# Patient Record
Sex: MALE | Race: OTHER | URBAN - METROPOLITAN AREA
[De-identification: names, ages, dates, MRNs, and addresses within clinical notes are randomized per-mention and may not be internally consistent; named-entity substitution may affect disease eponyms.]

---

## 2022-02-03 ENCOUNTER — EMERGENCY (EMERGENCY)
Facility: HOSPITAL | Age: 47
LOS: 1 days | Discharge: HOME | End: 2022-02-03
Admitting: EMERGENCY MEDICINE
Payer: COMMERCIAL

## 2022-02-03 VITALS
DIASTOLIC BLOOD PRESSURE: 86 MMHG | HEART RATE: 82 BPM | SYSTOLIC BLOOD PRESSURE: 171 MMHG | OXYGEN SATURATION: 95 % | HEIGHT: 64 IN | RESPIRATION RATE: 17 BRPM | TEMPERATURE: 98 F | WEIGHT: 160.06 LBS

## 2022-02-03 DIAGNOSIS — F10.10 ALCOHOL ABUSE, UNCOMPLICATED: ICD-10-CM
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
## 2022-02-03 DIAGNOSIS — Z23 ENCOUNTER FOR IMMUNIZATION: ICD-10-CM

## 2022-02-03 DIAGNOSIS — S01.81XA LACERATION WITHOUT FOREIGN BODY OF OTHER PART OF HEAD, INITIAL ENCOUNTER: ICD-10-CM

## 2022-02-03 PROCEDURE — 90471 IMMUNIZATION ADMIN: CPT

## 2022-02-03 PROCEDURE — 99283 EMERGENCY DEPT VISIT LOW MDM: CPT | Mod: 25

## 2022-02-03 PROCEDURE — 90715 TDAP VACCINE 7 YRS/> IM: CPT

## 2022-02-03 PROCEDURE — 12013 RPR F/E/E/N/L/M 2.6-5.0 CM: CPT

## 2022-02-03 RX ORDER — TETANUS TOXOID, REDUCED DIPHTHERIA TOXOID AND ACELLULAR PERTUSSIS VACCINE, ADSORBED 5; 2.5; 8; 8; 2.5 [IU]/.5ML; [IU]/.5ML; UG/.5ML; UG/.5ML; UG/.5ML
0.5 SUSPENSION INTRAMUSCULAR ONCE
Refills: 0 | Status: COMPLETED | OUTPATIENT
Start: 2022-02-03 | End: 2022-02-03

## 2022-02-03 RX ORDER — TETANUS TOXOID, REDUCED DIPHTHERIA TOXOID AND ACELLULAR PERTUSSIS VACCINE, ADSORBED 5; 2.5; 8; 8; 2.5 [IU]/.5ML; [IU]/.5ML; UG/.5ML; UG/.5ML; UG/.5ML
0.5 SUSPENSION INTRAMUSCULAR ONCE
Refills: 0 | Status: DISCONTINUED | OUTPATIENT
Start: 2022-02-03 | End: 2022-02-03

## 2022-02-03 RX ADMIN — TETANUS TOXOID, REDUCED DIPHTHERIA TOXOID AND ACELLULAR PERTUSSIS VACCINE, ADSORBED 0.5 MILLILITER(S): 5; 2.5; 8; 8; 2.5 SUSPENSION INTRAMUSCULAR at 10:57

## 2022-02-03 NOTE — ED ADULT TRIAGE NOTE - CHIEF COMPLAINT QUOTE
Pt presents to ED c/o laceration to right side of forehead. Sustained injury last night at 1130 pm, fell on apartment door step. No LOC, no use of blood thinners. No bleeding at this time. Denies headache, dizziness, blurred vision.

## 2022-02-03 NOTE — ED ADULT NURSE NOTE - OBJECTIVE STATEMENT
46y M, A&ox3, presents to ed for laceration to right forehead. Reports had etoh last night and sustained fall. No LOC. Approximaatly 4cm laceration to right fore head. Strip placed by PA. Tetanus updated in ED today.

## 2022-02-03 NOTE — ED PROVIDER NOTE - MUSCULOSKELETAL, MLM
Spine appears normal, range of motion is not limited, no muscle or joint tenderness Double Island Pedicle Flap Text: The defect edges were debeveled with a #15 scalpel blade.  Given the location of the defect, shape of the defect and the proximity to free margins a double island pedicle advancement flap was deemed most appropriate.  Using a sterile surgical marker, an appropriate advancement flap was drawn incorporating the defect, outlining the appropriate donor tissue and placing the expected incisions within the relaxed skin tension lines where possible.    The area thus outlined was incised deep to adipose tissue with a #15 scalpel blade.  The skin margins were undermined to an appropriate distance in all directions around the primary defect and laterally outward around the island pedicle utilizing iris scissors.  There was minimal undermining beneath the pedicle flap.

## 2022-02-03 NOTE — ED PROVIDER NOTE - CLINICAL SUMMARY MEDICAL DECISION MAKING FREE TEXT BOX
Pt seen for laceration of forehead at 11:30pm last night, declined sutoring     lac was anchord with steri strips and dermabond was applied    return precautions disussed

## 2022-02-03 NOTE — ED PROVIDER NOTE - SKIN, MLM
4cm laceration to R forehead. Patient requesting laceration to be glued. Laceration is .5cm deep, advised suture would be more effective closure as risk of dehiscence w/ dermabond. Patient verbalized would like to attempt dermabond closure, declines plastics at this time.  Intact otherwise, no discoloration, no signs of infection, no rashes.

## 2022-02-03 NOTE — ED PROVIDER NOTE - PATIENT PORTAL LINK FT
You can access the FollowMyHealth Patient Portal offered by [facility] by registering at the following website: [email or web address]. By joining [facility]’s FollowMyHealth portal, you will also be able to view your health information using other applications (apps) compatible with our system.

## 2022-02-03 NOTE — ED PROVIDER NOTE - CPE EDP EYES NORM
Detail Level: Simple Consent: Written consent obtained and the risks of skin tag removal was reviewed with the patient including but not limited to bleeding, pigmentary change, infection, pain, and remote possibility of scarring. Price (Use Numbers Only, No Special Characters Or $): 50 Removed With: scissors normal...

## 2022-02-03 NOTE — ED PROVIDER NOTE - NSFOLLOWUPINSTRUCTIONS_ED_ALL_ED_FT
Your tetanus vaccine will last ten years      Please keep laceration out of the sun for 3 to 6 months    apply maderma in 1.5 weeks    keep the area clean and dry for 3-4 days    If you notice pus discharge, increased warmth, if the wound opens up please return to the ed to be checked for infection      Care For Your Absorbable Stitches    WHAT YOU NEED TO KNOW:    Absorbable stitches, or sutures, are used to close cuts or wounds. These stitches are absorbed by your body, or fall off on their own within days or weeks. They do not need to be removed.             DISCHARGE INSTRUCTIONS:    Seek care immediately if:   •Your wound comes apart.       •You have red streaks around your wound.      •You have swollen or painful lymph nodes.      •Blood soaks through your bandage.      Contact your healthcare provider if:   •You have signs of an infection, such as increased redness, pain, swelling, or pus.      •You have a fever.      •Your stitches do not absorb when your healthcare provider says they should.      •You have questions or concerns about your condition or care.      Care for your wound and absorbable stitches as directed:   •Protect the stitches. Wear protective clothing over the stitches, and protect the area from sunlight. Do not place pressure on your wound. This could open your wound and increase your risk for an infection.      •Clean your wound as directed. Carefully wash the wound with soap and water. Gently dry the area and put on new, clean bandages as directed. Change your bandages when they get wet or dirty. Check your wound for signs of infection when you clean it. Signs include redness, swelling, and pus.      •Keep the area dry as directed. Wait 12 to 24 hours after you receive your stitches before you take a shower. Take showers instead of baths. Do not take a bath or swim. Your healthcare provider will give you instructions for bathing with your stitches.      Help your wound heal:   •Elevate your wound. Keep your wound above the level of your heart as often as you can. This will help decrease swelling and pain. Prop the area on pillows or blankets, if possible, to keep it elevated comfortably.      •Limit activity. Do not stretch the skin around your wound. This will help prevent bleeding and swelling.      Follow up with your doctor as directed: Write down your questions so you remember to ask them during your visits. Your tetanus vaccine will last ten years    Follow up elevated blood pressure of 170/80 with your [facility] doctor, please inform him of your blood pressure     Please keep laceration out of the sun for 3 to 6 months    apply maderma in 1.5 weeks    keep the area clean and dry for 3-4 days    If you notice pus discharge, increased warmth, if the wound opens up please return to the ed to be checked for infection      Care For Your Absorbable Stitches    WHAT YOU NEED TO KNOW:    Absorbable stitches, or sutures, are used to close cuts or wounds. These stitches are absorbed by your body, or fall off on their own within days or weeks. They do not need to be removed.             DISCHARGE INSTRUCTIONS:    Seek care immediately if:   •Your wound comes apart.       •You have red streaks around your wound.      •You have swollen or painful lymph nodes.      •Blood soaks through your bandage.      Contact your healthcare provider if:   •You have signs of an infection, such as increased redness, pain, swelling, or pus.      •You have a fever.      •Your stitches do not absorb when your healthcare provider says they should.      •You have questions or concerns about your condition or care.      Care for your wound and absorbable stitches as directed:   •Protect the stitches. Wear protective clothing over the stitches, and protect the area from sunlight. Do not place pressure on your wound. This could open your wound and increase your risk for an infection.      •Clean your wound as directed. Carefully wash the wound with soap and water. Gently dry the area and put on new, clean bandages as directed. Change your bandages when they get wet or dirty. Check your wound for signs of infection when you clean it. Signs include redness, swelling, and pus.      •Keep the area dry as directed. Wait 12 to 24 hours after you receive your stitches before you take a shower. Take showers instead of baths. Do not take a bath or swim. Your healthcare provider will give you instructions for bathing with your stitches.      Help your wound heal:   •Elevate your wound. Keep your wound above the level of your heart as often as you can. This will help decrease swelling and pain. Prop the area on pillows or blankets, if possible, to keep it elevated comfortably.      •Limit activity. Do not stretch the skin around your wound. This will help prevent bleeding and swelling.      Follow up with your doctor as directed: Write down your questions so you remember to ask them during your visits.

## 2022-02-03 NOTE — ED PROVIDER NOTE - OBJECTIVE STATEMENT
47 y/o M with no PMHx, stating last night he had a few alcoholic drinks, was not very intoxicated but fell at his doorstep at 11:30PM, struck R side of forehead and sustained laceration. Patient remembers entire incident clearly. No nausea, vomiting, LOC, headache, chest pain, SOB, other injuries. Tetanus not UTD. 47 y/o M with no PMHx, stating last night he had a few alcoholic drinks, was not very intoxicated but fell at his doorstep at 11:30PM, struck R side of forehead and sustained laceration. Patient remembers entire incident clearly. No nausea, vomiting, LOC, headache, chest pain, SOB, other injuries. Tetanus not UTD.    Pt declines plastics, or sutures at this time, preffers dermabond, pt informed wound is slightly deep might separate, he verbalized understanding